# Patient Record
Sex: MALE | Race: WHITE | HISPANIC OR LATINO | URBAN - METROPOLITAN AREA
[De-identification: names, ages, dates, MRNs, and addresses within clinical notes are randomized per-mention and may not be internally consistent; named-entity substitution may affect disease eponyms.]

---

## 2017-03-28 ENCOUNTER — OUTPATIENT (OUTPATIENT)
Dept: OUTPATIENT SERVICES | Facility: HOSPITAL | Age: 67
LOS: 1 days | End: 2017-03-28
Payer: COMMERCIAL

## 2017-03-28 ENCOUNTER — APPOINTMENT (OUTPATIENT)
Dept: CV DIAGNOSITCS | Facility: HOSPITAL | Age: 67
End: 2017-03-28

## 2017-03-28 DIAGNOSIS — R07.89 OTHER CHEST PAIN: ICD-10-CM

## 2017-03-28 DIAGNOSIS — I48.91 UNSPECIFIED ATRIAL FIBRILLATION: ICD-10-CM

## 2017-03-28 PROCEDURE — 93227 XTRNL ECG REC<48 HR R&I: CPT

## 2017-03-28 PROCEDURE — 93306 TTE W/DOPPLER COMPLETE: CPT | Mod: 26

## 2017-08-05 ENCOUNTER — INPATIENT (INPATIENT)
Facility: HOSPITAL | Age: 67
LOS: 2 days | Discharge: HOME | End: 2017-08-08
Attending: INTERNAL MEDICINE | Admitting: INTERNAL MEDICINE
Payer: COMMERCIAL

## 2017-08-05 VITALS
DIASTOLIC BLOOD PRESSURE: 92 MMHG | RESPIRATION RATE: 16 BRPM | TEMPERATURE: 98 F | HEART RATE: 90 BPM | SYSTOLIC BLOOD PRESSURE: 122 MMHG | OXYGEN SATURATION: 96 %

## 2017-08-05 DIAGNOSIS — L03.90 CELLULITIS, UNSPECIFIED: ICD-10-CM

## 2017-08-05 LAB
ALBUMIN SERPL ELPH-MCNC: 3.9 G/DL — SIGNIFICANT CHANGE UP (ref 3.3–5)
ALP SERPL-CCNC: 137 U/L — HIGH (ref 40–120)
ALT FLD-CCNC: 31 U/L — SIGNIFICANT CHANGE UP (ref 4–41)
APTT BLD: 69.6 SEC — HIGH (ref 27.5–37.4)
AST SERPL-CCNC: 65 U/L — HIGH (ref 4–40)
BASOPHILS # BLD AUTO: 0.05 K/UL — SIGNIFICANT CHANGE UP (ref 0–0.2)
BASOPHILS NFR BLD AUTO: 0.6 % — SIGNIFICANT CHANGE UP (ref 0–2)
BILIRUB SERPL-MCNC: 1.2 MG/DL — SIGNIFICANT CHANGE UP (ref 0.2–1.2)
BUN SERPL-MCNC: 13 MG/DL — SIGNIFICANT CHANGE UP (ref 7–23)
CALCIUM SERPL-MCNC: 8.5 MG/DL — SIGNIFICANT CHANGE UP (ref 8.4–10.5)
CHLORIDE SERPL-SCNC: 95 MMOL/L — LOW (ref 98–107)
CO2 SERPL-SCNC: 28 MMOL/L — SIGNIFICANT CHANGE UP (ref 22–31)
CREAT SERPL-MCNC: 1.02 MG/DL — SIGNIFICANT CHANGE UP (ref 0.5–1.3)
EOSINOPHIL # BLD AUTO: 0.39 K/UL — SIGNIFICANT CHANGE UP (ref 0–0.5)
EOSINOPHIL NFR BLD AUTO: 4.6 % — SIGNIFICANT CHANGE UP (ref 0–6)
GLUCOSE SERPL-MCNC: 129 MG/DL — HIGH (ref 70–99)
HCT VFR BLD CALC: 33.6 % — LOW (ref 39–50)
HGB BLD-MCNC: 10.6 G/DL — LOW (ref 13–17)
IMM GRANULOCYTES # BLD AUTO: 0.05 # — SIGNIFICANT CHANGE UP
IMM GRANULOCYTES NFR BLD AUTO: 0.6 % — SIGNIFICANT CHANGE UP (ref 0–1.5)
INR BLD: 2.98 — HIGH (ref 0.88–1.17)
LYMPHOCYTES # BLD AUTO: 0.7 K/UL — LOW (ref 1–3.3)
LYMPHOCYTES # BLD AUTO: 8.3 % — LOW (ref 13–44)
MCHC RBC-ENTMCNC: 30.2 PG — SIGNIFICANT CHANGE UP (ref 27–34)
MCHC RBC-ENTMCNC: 31.5 % — LOW (ref 32–36)
MCV RBC AUTO: 95.7 FL — SIGNIFICANT CHANGE UP (ref 80–100)
MONOCYTES # BLD AUTO: 0.76 K/UL — SIGNIFICANT CHANGE UP (ref 0–0.9)
MONOCYTES NFR BLD AUTO: 9 % — SIGNIFICANT CHANGE UP (ref 2–14)
NEUTROPHILS # BLD AUTO: 6.51 K/UL — SIGNIFICANT CHANGE UP (ref 1.8–7.4)
NEUTROPHILS NFR BLD AUTO: 76.9 % — SIGNIFICANT CHANGE UP (ref 43–77)
NRBC # FLD: 0 — SIGNIFICANT CHANGE UP
PLATELET # BLD AUTO: 373 K/UL — SIGNIFICANT CHANGE UP (ref 150–400)
PMV BLD: 8.8 FL — SIGNIFICANT CHANGE UP (ref 7–13)
POTASSIUM SERPL-MCNC: 5 MMOL/L — SIGNIFICANT CHANGE UP (ref 3.5–5.3)
POTASSIUM SERPL-SCNC: 5 MMOL/L — SIGNIFICANT CHANGE UP (ref 3.5–5.3)
PROT SERPL-MCNC: 8.4 G/DL — HIGH (ref 6–8.3)
PROTHROM AB SERPL-ACNC: 34.1 SEC — HIGH (ref 9.8–13.1)
RBC # BLD: 3.51 M/UL — LOW (ref 4.2–5.8)
RBC # FLD: 14.9 % — HIGH (ref 10.3–14.5)
SODIUM SERPL-SCNC: 136 MMOL/L — SIGNIFICANT CHANGE UP (ref 135–145)
WBC # BLD: 8.46 K/UL — SIGNIFICANT CHANGE UP (ref 3.8–10.5)
WBC # FLD AUTO: 8.46 K/UL — SIGNIFICANT CHANGE UP (ref 3.8–10.5)

## 2017-08-05 PROCEDURE — 99223 1ST HOSP IP/OBS HIGH 75: CPT

## 2017-08-05 RX ORDER — WARFARIN SODIUM 2.5 MG/1
2 TABLET ORAL ONCE
Qty: 0 | Refills: 0 | Status: COMPLETED | OUTPATIENT
Start: 2017-08-05 | End: 2017-08-05

## 2017-08-05 RX ADMIN — WARFARIN SODIUM 2 MILLIGRAM(S): 2.5 TABLET ORAL at 22:51

## 2017-08-05 RX ADMIN — Medication 100 MILLIGRAM(S): at 23:20

## 2017-08-05 NOTE — H&P ADULT - NSHPPHYSICALEXAM_GEN_ALL_CORE
PHYSICAL EXAM:      Constitutional: NAD, well-groomed, well-developed  HEENT: PERRLA, EOMI, Normal Hearing  Back: Normal spine flexure, No CVA tenderness  Respiratory: CTAB  Cardiovascular: S1 and S2, Premature contractions noted, metallic click audible   Gastrointestinal: BS+, soft, NT/ND  Extremities:1+ pitting edema BL LE  Vascular: 2+ peripheral pulses  Neurological: A/O x 3, no focal deficits  Psychiatric: Normal mood, normal affect  Musculoskeletal: 5/5 strength b/l upper and lower extremities  Skin: confluent erythema below right knee extending distally near foot

## 2017-08-05 NOTE — H&P ADULT - HISTORY OF PRESENT ILLNESS
68 yo m h/o rheumatic heart disease, s/p metallic mitral valve placement 2000. 2 weeks prior july 19th 2017, underwent CABG as well as metallic aortic valve repair at [facility]. Seen by Dr Lc greenwood PTA who noted redness/warmth at distal  RLE, and sent pt to ed for evaluation. Pt denies cp, sob, palpitations, h/o malignancy, or blood clot. Denies hemoptysis. INR currently 2.98. Pt denies fevers, chills, nausea, vomiting, diarrhea, trauma, insect bite 66 yo m h/o rheumatic heart disease, afib,  s/p metallic mitral valve placement 2000. 2 weeks ago, july 19th 2017, underwent CABG as well as metallic aortic valve repair at [facility]. Seen by Dr Lc greenwood PTA who noted redness/warmth at distal  RLE, and sent pt to ed for evaluation. Pt denies cp, sob, palpitations, h/o malignancy, or blood clot. Denies hemoptysis. INR currently 2.98. Pt denies fevers, chills, nausea, vomiting, diarrhea, trauma, insect bite

## 2017-08-05 NOTE — ED PROVIDER NOTE - ATTENDING CONTRIBUTION TO CARE
Attending note:   After face to face evaluation of this patient, I concur with above noted hx, pe, and care plan for this patient. 68 y/o M s/p mechanical valves x 2 and cabg in [place] with patient arriving in [place] by car this past week with new redness in r lower extremity and leg swelling (s/p vein-stripping).   Evaluation in progress

## 2017-08-05 NOTE — ED PROVIDER NOTE - MEDICAL DECISION MAKING DETAILS
rle swelling and erythema, recent surgery and recent long drive, will r/o dvt, give abx, and likely admit

## 2017-08-05 NOTE — H&P ADULT - NSHPREVIEWOFSYSTEMS_GEN_ALL_CORE
Review of Systems:   CONSTITUTIONAL: No fever, weight loss, or fatigue  EYES: No eye pain, visual disturbances, or discharge  ENMT:  No difficulty hearing, tinnitus, vertigo; No sinus or throat pain  NECK: No pain or stiffness  RESPIRATORY: No cough, wheezing, chills or hemoptysis; No shortness of breath. stable dyspnea with modearte exertion  CARDIOVASCULAR: No chest pain, palpitations, dizziness  GASTROINTESTINAL: No abdominal or epigastric pain. No nausea, vomiting, or hematemesis; No diarrhea or constipation. No melena or hematochezia.  GENITOURINARY: No dysuria, frequency, hematuria, or incontinence  NEUROLOGICAL: No headaches, memory loss, loss of strength, numbness, or tremors  SKIN: leg redness  LYMPH NODES: No enlarged glands  ENDOCRINE: No heat or cold intolerance; No hair loss  MUSCULOSKELETAL: No joint pain or swelling; No muscle, back, or extremity pain  HEME/LYMPH: No easy bruising, or bleeding gums  ALLERY AND IMMUNOLOGIC: No hives or eczema

## 2017-08-05 NOTE — H&P ADULT - PROBLEM SELECTOR PLAN 2
-INR >4 on friday; coumadin held on Friday, restated Saturday at 2mg instead of 5mg  -plan for additional 2mg sunday night pending repeat INR levels  -would contact cardiologist listed above to notify pt here, general reccs including appropriate cardiac rehab while pt in-house

## 2017-08-05 NOTE — ED ADULT NURSE NOTE - OBJECTIVE STATEMENT
pt received a&ox3, sent in by pmd s/p cabg at [facility] this week, states they drove back with stops along the way, md sent in for suspected cellulitis to right leg, swelling and redness noted, slightly warm to touch, stiches noted to leg and chest, no signs of infection noted to chest, pt states he always has cp, denies sob/nv, pt appears to be comfortable and in NAD, denies any complaints other than appearance of leg, vs as reported, md at bedside, pt placed on monitor, will continue to monitor.

## 2017-08-05 NOTE — ED ADULT TRIAGE NOTE - CHIEF COMPLAINT QUOTE
sent by PMD for IV antibiotics for right lower leg cellulitis.  (+) redness and swelling noted.  No sono performed to extremity post CABG 07/19/17.  Denies any fever.  PMH: HTN, fluid overload, afib, MVR, AVR,

## 2017-08-05 NOTE — H&P ADULT - ASSESSMENT
68 yo m with suspected right leg cellulitis s/p recent cabg/ aortic valve repair on coumadin. H/o metallic mitral valve

## 2017-08-05 NOTE — ED PROVIDER NOTE - OBJECTIVE STATEMENT
67M with pmh of recent b/l mechanical value placement (aortic, and mitral), and tricuspid valve repair, on warfarin p/w r le extremity cellulitis x 2 days and recent driving from [facility] to here; no cp/sob/n/v/d/dysuria/c/cough/cold;

## 2017-08-06 DIAGNOSIS — I05.9 RHEUMATIC MITRAL VALVE DISEASE, UNSPECIFIED: ICD-10-CM

## 2017-08-06 DIAGNOSIS — R60.0 LOCALIZED EDEMA: ICD-10-CM

## 2017-08-06 DIAGNOSIS — L03.115 CELLULITIS OF RIGHT LOWER LIMB: ICD-10-CM

## 2017-08-06 DIAGNOSIS — I48.91 UNSPECIFIED ATRIAL FIBRILLATION: ICD-10-CM

## 2017-08-06 LAB
APTT BLD: 68.2 SEC — HIGH (ref 27.5–37.4)
BUN SERPL-MCNC: 14 MG/DL — SIGNIFICANT CHANGE UP (ref 7–23)
CALCIUM SERPL-MCNC: 8.5 MG/DL — SIGNIFICANT CHANGE UP (ref 8.4–10.5)
CHLORIDE SERPL-SCNC: 100 MMOL/L — SIGNIFICANT CHANGE UP (ref 98–107)
CO2 SERPL-SCNC: 27 MMOL/L — SIGNIFICANT CHANGE UP (ref 22–31)
CREAT SERPL-MCNC: 0.84 MG/DL — SIGNIFICANT CHANGE UP (ref 0.5–1.3)
GLUCOSE SERPL-MCNC: 96 MG/DL — SIGNIFICANT CHANGE UP (ref 70–99)
HCT VFR BLD CALC: 29.3 % — LOW (ref 39–50)
HGB BLD-MCNC: 9.3 G/DL — LOW (ref 13–17)
INR BLD: 2.9 — HIGH (ref 0.88–1.17)
MCHC RBC-ENTMCNC: 30.3 PG — SIGNIFICANT CHANGE UP (ref 27–34)
MCHC RBC-ENTMCNC: 31.7 % — LOW (ref 32–36)
MCV RBC AUTO: 95.4 FL — SIGNIFICANT CHANGE UP (ref 80–100)
NRBC # FLD: 0 — SIGNIFICANT CHANGE UP
PLATELET # BLD AUTO: 317 K/UL — SIGNIFICANT CHANGE UP (ref 150–400)
PMV BLD: 8.6 FL — SIGNIFICANT CHANGE UP (ref 7–13)
POTASSIUM SERPL-MCNC: 4.1 MMOL/L — SIGNIFICANT CHANGE UP (ref 3.5–5.3)
POTASSIUM SERPL-SCNC: 4.1 MMOL/L — SIGNIFICANT CHANGE UP (ref 3.5–5.3)
PROTHROM AB SERPL-ACNC: 33.2 SEC — HIGH (ref 9.8–13.1)
RBC # BLD: 3.07 M/UL — LOW (ref 4.2–5.8)
RBC # FLD: 14.9 % — HIGH (ref 10.3–14.5)
SODIUM SERPL-SCNC: 138 MMOL/L — SIGNIFICANT CHANGE UP (ref 135–145)
WBC # BLD: 6.99 K/UL — SIGNIFICANT CHANGE UP (ref 3.8–10.5)
WBC # FLD AUTO: 6.99 K/UL — SIGNIFICANT CHANGE UP (ref 3.8–10.5)

## 2017-08-06 PROCEDURE — 93971 EXTREMITY STUDY: CPT | Mod: 26,LT

## 2017-08-06 RX ORDER — WARFARIN SODIUM 2.5 MG/1
2 TABLET ORAL ONCE
Qty: 0 | Refills: 0 | Status: COMPLETED | OUTPATIENT
Start: 2017-08-06 | End: 2017-08-06

## 2017-08-06 RX ORDER — ASPIRIN/CALCIUM CARB/MAGNESIUM 324 MG
1 TABLET ORAL
Qty: 0 | Refills: 0 | COMMUNITY

## 2017-08-06 RX ORDER — LOSARTAN POTASSIUM 100 MG/1
50 TABLET, FILM COATED ORAL DAILY
Qty: 0 | Refills: 0 | Status: DISCONTINUED | OUTPATIENT
Start: 2017-08-06 | End: 2017-08-08

## 2017-08-06 RX ORDER — LOSARTAN POTASSIUM 100 MG/1
1 TABLET, FILM COATED ORAL
Qty: 0 | Refills: 0 | COMMUNITY

## 2017-08-06 RX ORDER — ATORVASTATIN CALCIUM 80 MG/1
20 TABLET, FILM COATED ORAL AT BEDTIME
Qty: 0 | Refills: 0 | Status: DISCONTINUED | OUTPATIENT
Start: 2017-08-06 | End: 2017-08-08

## 2017-08-06 RX ORDER — FUROSEMIDE 40 MG
1 TABLET ORAL
Qty: 0 | Refills: 0 | COMMUNITY

## 2017-08-06 RX ORDER — OXYCODONE HYDROCHLORIDE 5 MG/1
1 TABLET ORAL
Qty: 0 | Refills: 0 | COMMUNITY

## 2017-08-06 RX ORDER — DILTIAZEM HCL 120 MG
300 CAPSULE, EXT RELEASE 24 HR ORAL DAILY
Qty: 0 | Refills: 0 | Status: DISCONTINUED | OUTPATIENT
Start: 2017-08-06 | End: 2017-08-08

## 2017-08-06 RX ORDER — ASPIRIN/CALCIUM CARB/MAGNESIUM 324 MG
81 TABLET ORAL EVERY 12 HOURS
Qty: 0 | Refills: 0 | Status: DISCONTINUED | OUTPATIENT
Start: 2017-08-06 | End: 2017-08-08

## 2017-08-06 RX ORDER — FUROSEMIDE 40 MG
40 TABLET ORAL DAILY
Qty: 0 | Refills: 0 | Status: DISCONTINUED | OUTPATIENT
Start: 2017-08-06 | End: 2017-08-08

## 2017-08-06 RX ORDER — ATORVASTATIN CALCIUM 80 MG/1
20 TABLET, FILM COATED ORAL ONCE
Qty: 0 | Refills: 0 | Status: COMPLETED | OUTPATIENT
Start: 2017-08-06 | End: 2017-08-06

## 2017-08-06 RX ORDER — POTASSIUM CHLORIDE 20 MEQ
1 PACKET (EA) ORAL
Qty: 0 | Refills: 0 | COMMUNITY

## 2017-08-06 RX ORDER — DILTIAZEM HCL 120 MG
1 CAPSULE, EXT RELEASE 24 HR ORAL
Qty: 0 | Refills: 0 | COMMUNITY

## 2017-08-06 RX ORDER — ASPIRIN/CALCIUM CARB/MAGNESIUM 324 MG
81 TABLET ORAL DAILY
Qty: 0 | Refills: 0 | Status: DISCONTINUED | OUTPATIENT
Start: 2017-08-06 | End: 2017-08-06

## 2017-08-06 RX ORDER — PANTOPRAZOLE SODIUM 20 MG/1
40 TABLET, DELAYED RELEASE ORAL
Qty: 0 | Refills: 0 | Status: DISCONTINUED | OUTPATIENT
Start: 2017-08-06 | End: 2017-08-08

## 2017-08-06 RX ORDER — PANTOPRAZOLE SODIUM 20 MG/1
1 TABLET, DELAYED RELEASE ORAL
Qty: 0 | Refills: 0 | COMMUNITY

## 2017-08-06 RX ORDER — ATORVASTATIN CALCIUM 80 MG/1
1 TABLET, FILM COATED ORAL
Qty: 0 | Refills: 0 | COMMUNITY

## 2017-08-06 RX ORDER — POTASSIUM CHLORIDE 20 MEQ
20 PACKET (EA) ORAL DAILY
Qty: 0 | Refills: 0 | Status: DISCONTINUED | OUTPATIENT
Start: 2017-08-06 | End: 2017-08-08

## 2017-08-06 RX ADMIN — WARFARIN SODIUM 2 MILLIGRAM(S): 2.5 TABLET ORAL at 17:57

## 2017-08-06 RX ADMIN — Medication 100 MILLIGRAM(S): at 14:26

## 2017-08-06 RX ADMIN — LOSARTAN POTASSIUM 50 MILLIGRAM(S): 100 TABLET, FILM COATED ORAL at 06:31

## 2017-08-06 RX ADMIN — Medication 100 MILLIGRAM(S): at 21:48

## 2017-08-06 RX ADMIN — Medication 81 MILLIGRAM(S): at 14:26

## 2017-08-06 RX ADMIN — Medication 300 MILLIGRAM(S): at 06:31

## 2017-08-06 RX ADMIN — PANTOPRAZOLE SODIUM 40 MILLIGRAM(S): 20 TABLET, DELAYED RELEASE ORAL at 06:31

## 2017-08-06 RX ADMIN — Medication 40 MILLIGRAM(S): at 06:31

## 2017-08-06 RX ADMIN — Medication 100 MILLIGRAM(S): at 06:47

## 2017-08-06 RX ADMIN — ATORVASTATIN CALCIUM 20 MILLIGRAM(S): 80 TABLET, FILM COATED ORAL at 21:48

## 2017-08-06 RX ADMIN — Medication 1 TABLET(S): at 14:26

## 2017-08-06 RX ADMIN — Medication 81 MILLIGRAM(S): at 17:56

## 2017-08-06 RX ADMIN — ATORVASTATIN CALCIUM 20 MILLIGRAM(S): 80 TABLET, FILM COATED ORAL at 01:29

## 2017-08-06 NOTE — PROGRESS NOTE ADULT - SUBJECTIVE AND OBJECTIVE BOX
complaints-  patient seen in my office yesterday  swelling of thelegs  denies chest pain or  sob or dizziness or palpitations.   denies fever or chills    PMH- cad/mvr/avr/HTN/atrial fibrillation    PSH- cabg/mvr/avr/cholecystectomy    ALL- nkda    meds- as per chart    smoking- negative    Vital Signs Last 24 Hrs  T(C): 36.8 (06 Aug 2017 14:56), Max: 37.1 (06 Aug 2017 05:52)  T(F): 98.3 (06 Aug 2017 14:56), Max: 98.7 (06 Aug 2017 05:52)  HR: 86 (06 Aug 2017 14:56) (83 - 99)  BP: 122/70 (06 Aug 2017 14:56) (122/70 - 159/55)  BP(mean): --  RR: 18 (06 Aug 2017 14:56) (16 - 25)  SpO2: 99% (06 Aug 2017 14:56) (95% - 99%)      CAPILLARY BLOOD GLUCOSE    Physical exam  awake and alert  neck - no0 JVD  cvs- s1s2 present. no s3s4  ra- bilateral air entry present. no creps  pa- no g/r/d/t. bs present   ext- 1+edema bilaterally  erythema with warmth in the right lower leg below the knee to above the ankle in the cherrie medial aspect      Labs                        9.3    6.99  )-----------( 317      ( 06 Aug 2017 06:30 )             29.3     08-06    138  |  100  |  14  ----------------------------<  96  4.1   |  27  |  0.84    Ca    8.5      06 Aug 2017 06:30    TPro  8.4<H>  /  Alb  3.9  /  TBili  1.2  /  DBili  x   /  AST  65<H>  /  ALT  31  /  AlkPhos  137<H>  08-05        PT/INR - ( 06 Aug 2017 06:30 )   PT: 33.2 SEC;   INR: 2.90          PTT - ( 06 Aug 2017 06:30 )  PTT:68.2 SEC      Assessment and plan  patient mvr s/p recent avr with cad s/p cabg with swelling and redness in the RLE- ruled out for DVT. possible cellulitis RLE- on iv clindamycin    obtain blood cultures    HTN/afib- continue cardizem 300 mg/lasix 40 mg po qd/losartan 50 mg po qd    afib/mvr/avr- on coumadin based on INR    continue current care.  discussed with patient and wife at bedside

## 2017-08-06 NOTE — PATIENT PROFILE ADULT. - LEARNING ASSESSMENT (PATIENT) ADDITIONAL COMMENTS
Patient anxious to be hospitalized.  Wife accompanied him to hospital.  She is an RN and Patient seems dependent on her caring for him.

## 2017-08-07 LAB
BUN SERPL-MCNC: 11 MG/DL — SIGNIFICANT CHANGE UP (ref 7–23)
CALCIUM SERPL-MCNC: 8.7 MG/DL — SIGNIFICANT CHANGE UP (ref 8.4–10.5)
CHLORIDE SERPL-SCNC: 97 MMOL/L — LOW (ref 98–107)
CO2 SERPL-SCNC: 29 MMOL/L — SIGNIFICANT CHANGE UP (ref 22–31)
CREAT SERPL-MCNC: 0.91 MG/DL — SIGNIFICANT CHANGE UP (ref 0.5–1.3)
GLUCOSE SERPL-MCNC: 97 MG/DL — SIGNIFICANT CHANGE UP (ref 70–99)
HCT VFR BLD CALC: 31.5 % — LOW (ref 39–50)
HGB BLD-MCNC: 10 G/DL — LOW (ref 13–17)
INR BLD: 3.44 — HIGH (ref 0.88–1.17)
MCHC RBC-ENTMCNC: 30.3 PG — SIGNIFICANT CHANGE UP (ref 27–34)
MCHC RBC-ENTMCNC: 31.7 % — LOW (ref 32–36)
MCV RBC AUTO: 95.5 FL — SIGNIFICANT CHANGE UP (ref 80–100)
NRBC # FLD: 0 — SIGNIFICANT CHANGE UP
PLATELET # BLD AUTO: 315 K/UL — SIGNIFICANT CHANGE UP (ref 150–400)
PMV BLD: 9 FL — SIGNIFICANT CHANGE UP (ref 7–13)
POTASSIUM SERPL-MCNC: 4 MMOL/L — SIGNIFICANT CHANGE UP (ref 3.5–5.3)
POTASSIUM SERPL-SCNC: 4 MMOL/L — SIGNIFICANT CHANGE UP (ref 3.5–5.3)
PROTHROM AB SERPL-ACNC: 39.5 SEC — HIGH (ref 9.8–13.1)
RBC # BLD: 3.3 M/UL — LOW (ref 4.2–5.8)
RBC # FLD: 14.6 % — HIGH (ref 10.3–14.5)
SODIUM SERPL-SCNC: 137 MMOL/L — SIGNIFICANT CHANGE UP (ref 135–145)
SPECIMEN SOURCE: SIGNIFICANT CHANGE UP
SPECIMEN SOURCE: SIGNIFICANT CHANGE UP
WBC # BLD: 5.81 K/UL — SIGNIFICANT CHANGE UP (ref 3.8–10.5)
WBC # FLD AUTO: 5.81 K/UL — SIGNIFICANT CHANGE UP (ref 3.8–10.5)

## 2017-08-07 RX ORDER — SIMETHICONE 80 MG/1
80 TABLET, CHEWABLE ORAL ONCE
Qty: 0 | Refills: 0 | Status: DISCONTINUED | OUTPATIENT
Start: 2017-08-07 | End: 2017-08-07

## 2017-08-07 RX ADMIN — Medication 1 TABLET(S): at 13:18

## 2017-08-07 RX ADMIN — Medication 81 MILLIGRAM(S): at 17:34

## 2017-08-07 RX ADMIN — Medication 300 MILLIGRAM(S): at 05:55

## 2017-08-07 RX ADMIN — Medication 100 MILLIGRAM(S): at 13:18

## 2017-08-07 RX ADMIN — ATORVASTATIN CALCIUM 20 MILLIGRAM(S): 80 TABLET, FILM COATED ORAL at 21:19

## 2017-08-07 RX ADMIN — LOSARTAN POTASSIUM 50 MILLIGRAM(S): 100 TABLET, FILM COATED ORAL at 05:55

## 2017-08-07 RX ADMIN — Medication 81 MILLIGRAM(S): at 05:55

## 2017-08-07 RX ADMIN — Medication 100 MILLIGRAM(S): at 05:55

## 2017-08-07 RX ADMIN — Medication 40 MILLIGRAM(S): at 05:55

## 2017-08-07 RX ADMIN — PANTOPRAZOLE SODIUM 40 MILLIGRAM(S): 20 TABLET, DELAYED RELEASE ORAL at 05:55

## 2017-08-07 NOTE — CONSULT NOTE ADULT - ASSESSMENT
Valvular and ischemic cardiomyopathy, s/p redo OHS with AVR/MVR/CABG/TV repair 2 weeks ago at the [facility].  I tend to doubt cellulitis with patient afebrile, normal wbc, and lack of pain.  I suspect we are dealing with post op venous stasis and soft tissue hematoma.  I would like to limit antibiotics which are unlikely to be of benefit.  Suggest:   Favor d/c of antibiotics   Leg elevation   Diuresis   He will likely need compressive stockings.Untill edema is improved I would suggest ACE wraps with gradual increase in compression   Above reviewed with the patient.

## 2017-08-07 NOTE — PROGRESS NOTE ADULT - SUBJECTIVE AND OBJECTIVE BOX
complaints-  patient is comfortable. denies chest pain or sob  denies fever or chills   wife at bedside        Vital Signs Last 24 Hrs  T(C): 36.6 (07 Aug 2017 15:38), Max: 37.1 (06 Aug 2017 21:51)  T(F): 97.8 (07 Aug 2017 15:38), Max: 98.8 (06 Aug 2017 21:51)  HR: 100 (07 Aug 2017 15:38) (93 - 100)  BP: 133/69 (07 Aug 2017 15:38) (133/69 - 155/80)  BP(mean): --  RR: 19 (07 Aug 2017 15:38) (18 - 19)  SpO2: 97% (07 Aug 2017 15:38) (96% - 97%)      CAPILLARY BLOOD GLUCOSE  Physical exam  awake and alert   neck - no jvd   cvs- s1s2 present. no s3s4  rs- bilateral air entry present.mildly decreased air entry in the posterior bases  pa- no g/r/d/t. bs present  ext- 1+edema in the LLE is reduced compared to the RLE  RLE- diffuse erythema with some warmth. no tenderness      Labs                        10.0   5.81  )-----------( 315      ( 07 Aug 2017 05:45 )             31.5     08-07    137  |  97<L>  |  11  ----------------------------<  97  4.0   |  29  |  0.91    Ca    8.7      07 Aug 2017 05:45    TPro  8.4<H>  /  Alb  3.9  /  TBili  1.2  /  DBili  x   /  AST  65<H>  /  ALT  31  /  AlkPhos  137<H>  08-05        PT/INR - ( 07 Aug 2017 05:45 )   PT: 39.5 SEC;   INR: 3.44          PTT - ( 06 Aug 2017 06:30 )  PTT:68.2 SEC    blood cultures- negative so far      Assessment and plan  patient s/p avr  s/p recent cabg with swelling and redness of the RLE- afebrile with normal wbc count. agree with Id about venous statis with some hematoma- will dc clindamycin and persue diuresis  continue lasix 40 mg po qd.venous stockings.  will also obtain chest xray to R/O Pleural effusion.  warfarin based on INR   continue current meds.  discussed with patient and his wife

## 2017-08-07 NOTE — CONSULT NOTE ADULT - SUBJECTIVE AND OBJECTIVE BOX
HPI:   Patient is a 67y male with a history of valvular cardiomyopathy felt secondary to rheumatic fever who is s/p redo AVR,MVR and TV repair/CABG at the [facility] on 7/19.He had a relatively uneventful post op course.He had developed swelling of Rt leg, was taking anticoagulation, and was admitted to [facility] for evaluation.He has had negative dopplers for DVT, was placed on clinda for possible infection.No fever or chills.No significant pain.He has been ambulatory and is receiving diuretics.    REVIEW OF SYSTEMS:  All other review of systems negative (Comprehensive ROS)    PAST MEDICAL & SURGICAL HISTORY:  HTN (Hypertension)  Afib  S/P MVR (Mitral Valve Replacement)  S/P AVR (Aortic Valve Replacement)      Allergies    No Known Allergies    Intolerances        Antimicrobials Day # 3  clindamycin IVPB 600 milliGRAM(s) IV Intermittent every 8 hours    Other Medications:  diltiazem    milliGRAM(s) Oral daily  potassium chloride    Tablet ER 20 milliEquivalent(s) Oral daily PRN  pantoprazole    Tablet 40 milliGRAM(s) Oral before breakfast  multivitamin 1 Tablet(s) Oral daily  atorvastatin 20 milliGRAM(s) Oral at bedtime  furosemide    Tablet 40 milliGRAM(s) Oral daily  bisacodyl 5 milliGRAM(s) Oral every 12 hours PRN  losartan 50 milliGRAM(s) Oral daily  aspirin enteric coated 81 milliGRAM(s) Oral every 12 hours      FAMILY HISTORY:      SOCIAL HISTORY:  Smoking: remote    ETOH:  no    Drug Use: no        T(F): 97.8 (08-07-17 @ 15:38), Max: 98.8 (08-06-17 @ 21:51)  HR: 100 (08-07-17 @ 15:38)  BP: 133/69 (08-07-17 @ 15:38)  RR: 19 (08-07-17 @ 15:38)  SpO2: 97% (08-07-17 @ 15:38)  Wt(kg): --    PHYSICAL EXAM:  General: alert, no acute distress  Eyes:  anicteric, no conjunctival injection, no discharge  Oropharynx: no lesions or injection 	  Neck: supple, without adenopathy  Lungs: diminished at bases.chest incision C/D/I  Heart: irregular rate and rhythm; no murmur, rubs or gallops  Abdomen: soft, nondistended, nontender, without mass or organomegaly  Skin: no lesions  Extremities: 2-3 T edema of right leg.1+ edema of left leg with venous stasis changes.Diffuse ecchymosis and warmth of right calf, dried scabs at exit sites of vein harvest site.No tenderness to plapation  Neurologic: alert, oriented, moves all extremities    LAB RESULTS:                        10.0   5.81  )-----------( 315      ( 07 Aug 2017 05:45 )             31.5     08-07    137  |  97<L>  |  11  ----------------------------<  97  4.0   |  29  |  0.91    Ca    8.7      07 Aug 2017 05:45    TPro  8.4<H>  /  Alb  3.9  /  TBili  1.2  /  DBili  x   /  AST  65<H>  /  ALT  31  /  AlkPhos  137<H>  08-05    LIVER FUNCTIONS - ( 05 Aug 2017 20:02 )  Alb: 3.9 g/dL / Pro: 8.4 g/dL / ALK PHOS: 137 u/L / ALT: 31 u/L / AST: 65 u/L / GGT: x               MICROBIOLOGY:  RECENT CULTURES:  HPI:   Patient is a 67y male with    REVIEW OF SYSTEMS:  All other review of systems negative (Comprehensive ROS)    PAST MEDICAL & SURGICAL HISTORY:  HTN (Hypertension)  Afib  S/P MVR (Mitral Valve Replacement)  S/P AVR (Aortic Valve Replacement)      Allergies    No Known Allergies    Intolerances        Antimicrobials Day #  :  clindamycin IVPB 600 milliGRAM(s) IV Intermittent every 8 hours    Other Medications:  diltiazem    milliGRAM(s) Oral daily  potassium chloride    Tablet ER 20 milliEquivalent(s) Oral daily PRN  pantoprazole    Tablet 40 milliGRAM(s) Oral before breakfast  multivitamin 1 Tablet(s) Oral daily  atorvastatin 20 milliGRAM(s) Oral at bedtime  furosemide    Tablet 40 milliGRAM(s) Oral daily  bisacodyl 5 milliGRAM(s) Oral every 12 hours PRN  losartan 50 milliGRAM(s) Oral daily  aspirin enteric coated 81 milliGRAM(s) Oral every 12 hours      FAMILY HISTORY:      SOCIAL HISTORY:  Smoking:     ETOH:     Drug Use:     Single     T(F): 97.8 (08-07-17 @ 15:38), Max: 98.8 (08-06-17 @ 21:51)  HR: 100 (08-07-17 @ 15:38)  BP: 133/69 (08-07-17 @ 15:38)  RR: 19 (08-07-17 @ 15:38)  SpO2: 97% (08-07-17 @ 15:38)  Wt(kg): --    PHYSICAL EXAM:  General: alert, no acute distress  Eyes:  anicteric, no conjunctival injection, no discharge  Oropharynx: no lesions or injection 	  Neck: supple, without adenopathy  Lungs: clear to auscultation  Heart: regular rate and rhythm; no murmur, rubs or gallops  Abdomen: soft, nondistended, nontender, without mass or organomegaly  Skin: no lesions  Extremities: no clubbing, cyanosis, or edema  Neurologic: alert, oriented, moves all extremities    LAB RESULTS:                        10.0   5.81  )-----------( 315      ( 07 Aug 2017 05:45 )             31.5     08-07    137  |  97<L>  |  11  ----------------------------<  97  4.0   |  29  |  0.91    Ca    8.7      07 Aug 2017 05:45    TPro  8.4<H>  /  Alb  3.9  /  TBili  1.2  /  DBili  x   /  AST  65<H>  /  ALT  31  /  AlkPhos  137<H>  08-05    LIVER FUNCTIONS - ( 05 Aug 2017 20:02 )  Alb: 3.9 g/dL / Pro: 8.4 g/dL / ALK PHOS: 137 u/L / ALT: 31 u/L / AST: 65 u/L / GGT: x               MICROBIOLOGY:  RECENT CULTURES:    none sent          RADIOLOGY REVIEWED:  Leg dopplers are negative for DVT

## 2017-08-08 ENCOUNTER — TRANSCRIPTION ENCOUNTER (OUTPATIENT)
Age: 67
End: 2017-08-08

## 2017-08-08 VITALS
DIASTOLIC BLOOD PRESSURE: 77 MMHG | HEART RATE: 69 BPM | SYSTOLIC BLOOD PRESSURE: 132 MMHG | TEMPERATURE: 99 F | OXYGEN SATURATION: 100 % | RESPIRATION RATE: 18 BRPM

## 2017-08-08 LAB
INR BLD: 2.86 — HIGH (ref 0.88–1.17)
PROTHROM AB SERPL-ACNC: 32.7 SEC — HIGH (ref 9.8–13.1)

## 2017-08-08 PROCEDURE — 71020: CPT | Mod: 26

## 2017-08-08 RX ORDER — WARFARIN SODIUM 2.5 MG/1
2 TABLET ORAL ONCE
Qty: 0 | Refills: 0 | Status: DISCONTINUED | OUTPATIENT
Start: 2017-08-08 | End: 2017-08-08

## 2017-08-08 RX ORDER — WARFARIN SODIUM 2.5 MG/1
1 TABLET ORAL
Qty: 0 | Refills: 0 | COMMUNITY

## 2017-08-08 RX ORDER — WARFARIN SODIUM 2.5 MG/1
5 TABLET ORAL ONCE
Qty: 0 | Refills: 0 | Status: DISCONTINUED | OUTPATIENT
Start: 2017-08-08 | End: 2017-08-08

## 2017-08-08 RX ORDER — WARFARIN SODIUM 2.5 MG/1
1 TABLET ORAL
Qty: 2 | Refills: 0 | OUTPATIENT
Start: 2017-08-08 | End: 2017-08-10

## 2017-08-08 RX ORDER — WARFARIN SODIUM 2.5 MG/1
1 TABLET ORAL
Qty: 1 | Refills: 0 | OUTPATIENT
Start: 2017-08-08 | End: 2017-08-09

## 2017-08-08 RX ORDER — WARFARIN SODIUM 2.5 MG/1
3 TABLET ORAL ONCE
Qty: 0 | Refills: 0 | Status: DISCONTINUED | OUTPATIENT
Start: 2017-08-08 | End: 2017-08-08

## 2017-08-08 RX ADMIN — Medication 300 MILLIGRAM(S): at 06:43

## 2017-08-08 RX ADMIN — Medication 1 TABLET(S): at 12:46

## 2017-08-08 RX ADMIN — PANTOPRAZOLE SODIUM 40 MILLIGRAM(S): 20 TABLET, DELAYED RELEASE ORAL at 06:43

## 2017-08-08 RX ADMIN — Medication 40 MILLIGRAM(S): at 06:43

## 2017-08-08 RX ADMIN — LOSARTAN POTASSIUM 50 MILLIGRAM(S): 100 TABLET, FILM COATED ORAL at 06:43

## 2017-08-08 RX ADMIN — Medication 81 MILLIGRAM(S): at 06:43

## 2017-08-08 NOTE — CHART NOTE - NSCHARTNOTEFT_GEN_A_CORE
Spoke with Dr Vicente May [phone] pt's cardiology about discharge and follow up INR. Dr May requested Coumadin 5 mg tonight and will follow up with pt tomorrow. INR today 2.86. Goal 3-3.5 pt s/p MVR. Dr Platt aware as well.

## 2017-08-08 NOTE — PROGRESS NOTE ADULT - SUBJECTIVE AND OBJECTIVE BOX
complaints-  denies chest pain or sob  denies fever or chills        Vital Signs Last 24 Hrs  T(C): 37 (08 Aug 2017 05:39), Max: 37 (08 Aug 2017 05:39)  T(F): 98.6 (08 Aug 2017 05:39), Max: 98.6 (08 Aug 2017 05:39)  HR: 100 (08 Aug 2017 05:39) (100 - 103)  BP: 140/77 (08 Aug 2017 05:39) (133/69 - 150/76)  BP(mean): --  RR: 18 (08 Aug 2017 05:39) (18 - 19)  SpO2: 99% (08 Aug 2017 05:39) (97% - 99%)      CAPILLARY BLOOD GLUCOSE      Physical exam  awake and alert  neck - no jvd   cvs- s1s2 present. no s3s4  rs- bilateral air entry present  slightly decresaed air entry at the posterior bases  pa- no g/r/d/t. bs present  ext- edema is much improved  RLE- erythema in the cherrie lateral aspect present          Labs                        10.0   5.81  )-----------( 315      ( 07 Aug 2017 05:45 )             31.5     08-07    137  |  97<L>  |  11  ----------------------------<  97  4.0   |  29  |  0.91    Ca    8.7      07 Aug 2017 05:45          PT/INR - ( 08 Aug 2017 06:55 )   PT: 32.7 SEC;   INR: 2.86       blood cultures- negative    Assessment and plan  patient is s/p cabg and avr-stable  not likely cellulitis RLE-possible venous stasis and hematoma  will follow up on chest xray  continue lasix 40 mg po qd  continue rest of meds.  coumadin 2 mg tonight  discusseed with patient  dc planning based on chest xray report

## 2017-08-08 NOTE — DISCHARGE NOTE ADULT - NS AS ACTIVITY OBS
Walking-Indoors allowed/Showering allowed/Walking-Outdoors allowed as tolerated/Walking-Outdoors allowed/Showering allowed/Walking-Indoors allowed

## 2017-08-08 NOTE — DISCHARGE NOTE ADULT - CARE PROVIDER_API CALL
Joaquina Platt), Internal Medicine  [address] [place] [address]  Phone: [phone]  Fax: [phone]

## 2017-08-08 NOTE — DISCHARGE NOTE ADULT - MEDICATION SUMMARY - MEDICATIONS TO TAKE
I will START or STAY ON the medications listed below when I get home from the hospital:    aspirin 81 mg oral tablet  -- 1 tab(s) by mouth once a day  -- Indication: For Atrial fibrillation, unspecified type    oxyCODONE 5 mg oral tablet  -- 1 tab(s) by mouth every 6 hours, As Needed  -- Indication: For pain control     losartan 50 mg oral tablet  -- 1 tab(s) by mouth once a day  -- Indication: For HTN (hypertension)    DilTIAZem Hydrochloride  mg/24 hours oral capsule, extended release  -- 1 cap(s) by mouth once a day  -- Indication: For Atrial fibrillation, unspecified type    warfarin 5 mg oral tablet  -- 1 tab(s) by mouth once a day on 8/8/2017 then call your cardiologist on 8/9/2017  -- Indication: For S/P MVR (mitral valve replacement)    Lipitor 20 mg oral tablet  -- 1 tab(s) by mouth once a day  -- Indication: For HLD    Lasix 40 mg oral tablet  -- 1 tab(s) by mouth once a day  -- Indication: For Leg edema    bisacodyl 5 mg oral delayed release tablet  -- 2 tab(s) rectally once a day, As Needed  -- Indication: For Constipation     potassium chloride 20 mEq oral granule, extended release  -- 1 tab(s) by mouth once a day  -- Indication: For Hypokalemia     Protonix 20 mg oral delayed release tablet  -- 1 tab(s) by mouth once a day  -- Indication: For GERD    multivitamin  -- 1 tab(s) by mouth once a day  -- Indication: For Supplement

## 2017-08-08 NOTE — DISCHARGE NOTE ADULT - PATIENT PORTAL LINK FT
“You can access the [facility] Patient Portal, offered by [facility], by registering with the following website: [email or web address]”

## 2017-08-08 NOTE — DISCHARGE NOTE ADULT - PLAN OF CARE
resolved infection Pt initially treated with IV antibiotics, consulted by ID felt no need for antibiotics. Pt remained afebrile no leukocytosis. US showed No  evidence  of  right  lower  extremity  deep  venous  thrombosis. anticoagulation Coumadin 5 mg on 8/8/2017 call Dr Vicente May [phone] Coumadin 5 mg on 8/8/2017 call Dr Vicente May [phone] in AM to discuss Coumadin dose BP control cont Cardizem 300 mg. Lasix 40 mg po qd/losartan 50 mg po qd Pt initially treated with IV antibiotics, consulted by ID felt no need for antibiotics. Pt remained afebrile no leukocytosis. US showed No  evidence  of  right  lower  extremity  deep  venous  thrombosis.    Elevated Lower extremity, keep antiembolic stocking. Please call dr Platt's office to schedule a follow up appt in 1 week Coumadin 5 mg on 8/8/2017 call Dr Vicente May [phone] in AM to discuss Coumadin dose

## 2017-08-08 NOTE — DISCHARGE NOTE ADULT - CARE PLAN
Principal Discharge DX:	Cellulitis of right lower extremity  Goal:	resolved infection  Instructions for follow-up, activity and diet:	Pt initially treated with IV antibiotics, consulted by ID felt no need for antibiotics. Pt remained afebrile no leukocytosis. US showed No  evidence  of  right  lower  extremity  deep  venous  thrombosis.  Secondary Diagnosis:	S/P MVR (mitral valve replacement)  Goal:	anticoagulation  Instructions for follow-up, activity and diet:	Coumadin 5 mg on 8/8/2017 call Dr Vicente May [phone]  Secondary Diagnosis:	Atrial fibrillation, unspecified type  Goal:	anticoagulation  Secondary Diagnosis:	HTN (hypertension) Principal Discharge DX:	Cellulitis of right lower extremity  Goal:	resolved infection  Instructions for follow-up, activity and diet:	Pt initially treated with IV antibiotics, consulted by ID felt no need for antibiotics. Pt remained afebrile no leukocytosis. US showed No  evidence  of  right  lower  extremity  deep  venous  thrombosis.    Elevated Lower extremity, keep antiembolic stocking. Please call dr Platt's office to schedule a follow up appt in 1 week  Secondary Diagnosis:	S/P MVR (mitral valve replacement)  Goal:	anticoagulation  Instructions for follow-up, activity and diet:	Coumadin 5 mg on 8/8/2017 call Dr Vicente May [phone] in AM to discuss Coumadin dose  Secondary Diagnosis:	Atrial fibrillation, unspecified type  Goal:	anticoagulation  Instructions for follow-up, activity and diet:	Coumadin 5 mg on 8/8/2017 call Dr Vicente May [phone] in AM to discuss Coumadin dose  Secondary Diagnosis:	HTN (hypertension)  Goal:	BP control  Instructions for follow-up, activity and diet:	cont Cardizem 300 mg. Lasix 40 mg po qd/losartan 50 mg po qd Principal Discharge DX:	Cellulitis of right lower extremity  Goal:	resolved infection  Instructions for follow-up, activity and diet:	Pt initially treated with IV antibiotics, consulted by ID felt no need for antibiotics. Pt remained afebrile no leukocytosis. US showed No  evidence  of  right  lower  extremity  deep  venous  thrombosis.    Elevated Lower extremity, keep antiembolic stocking. Please call dr Platt's office to schedule a follow up appt in 1 week  Secondary Diagnosis:	S/P MVR (mitral valve replacement)  Goal:	anticoagulation  Instructions for follow-up, activity and diet:	Coumadin 5 mg on 8/8/2017 call Dr Vicente May [phone] in AM to discuss Coumadin dose  Secondary Diagnosis:	Atrial fibrillation, unspecified type  Goal:	anticoagulation  Instructions for follow-up, activity and diet:	Coumadin 5 mg on 8/8/2017 call Dr Vicente May [phone] in AM to discuss Coumadin dose  Secondary Diagnosis:	HTN (hypertension)  Goal:	BP control  Instructions for follow-up, activity and diet:	cont Cardizem 300 mg. Lasix 40 mg po qd/losartan 50 mg po qd

## 2017-08-11 LAB
BACTERIA BLD CULT: SIGNIFICANT CHANGE UP
BACTERIA BLD CULT: SIGNIFICANT CHANGE UP

## 2018-05-04 ENCOUNTER — APPOINTMENT (OUTPATIENT)
Dept: CV DIAGNOSITCS | Facility: HOSPITAL | Age: 68
End: 2018-05-04
Payer: COMMERCIAL

## 2018-05-04 ENCOUNTER — OUTPATIENT (OUTPATIENT)
Dept: OUTPATIENT SERVICES | Facility: HOSPITAL | Age: 68
LOS: 1 days | End: 2018-05-04

## 2018-05-04 DIAGNOSIS — Z95.2 PRESENCE OF PROSTHETIC HEART VALVE: ICD-10-CM

## 2018-05-04 PROCEDURE — 93306 TTE W/DOPPLER COMPLETE: CPT | Mod: 26

## 2018-07-24 NOTE — DISCHARGE NOTE ADULT - MEDICATION SUMMARY - MEDICATIONS TO CHANGE
negative I will SWITCH the dose or number of times a day I take the medications listed below when I get home from the hospital:  None

## 2019-05-09 ENCOUNTER — OUTPATIENT (OUTPATIENT)
Dept: OUTPATIENT SERVICES | Facility: HOSPITAL | Age: 69
LOS: 1 days | End: 2019-05-09

## 2019-05-09 ENCOUNTER — APPOINTMENT (OUTPATIENT)
Dept: CV DIAGNOSITCS | Facility: HOSPITAL | Age: 69
End: 2019-05-09
Payer: COMMERCIAL

## 2019-05-09 DIAGNOSIS — Z95.2 PRESENCE OF PROSTHETIC HEART VALVE: ICD-10-CM

## 2019-05-09 DIAGNOSIS — I48.91 UNSPECIFIED ATRIAL FIBRILLATION: ICD-10-CM

## 2019-05-09 PROCEDURE — 93306 TTE W/DOPPLER COMPLETE: CPT | Mod: 26

## 2020-06-08 ENCOUNTER — OUTPATIENT (OUTPATIENT)
Dept: OUTPATIENT SERVICES | Facility: HOSPITAL | Age: 70
LOS: 1 days | End: 2020-06-08
Payer: COMMERCIAL

## 2020-06-08 ENCOUNTER — APPOINTMENT (OUTPATIENT)
Dept: CV DIAGNOSITCS | Facility: HOSPITAL | Age: 70
End: 2020-06-08

## 2020-06-08 DIAGNOSIS — R07.89 OTHER CHEST PAIN: ICD-10-CM

## 2020-06-08 PROCEDURE — 93227 XTRNL ECG REC<48 HR R&I: CPT

## 2020-06-08 PROCEDURE — 93306 TTE W/DOPPLER COMPLETE: CPT | Mod: 26

## 2021-06-04 ENCOUNTER — OUTPATIENT (OUTPATIENT)
Dept: OUTPATIENT SERVICES | Facility: HOSPITAL | Age: 71
LOS: 1 days | End: 2021-06-04

## 2021-06-04 ENCOUNTER — APPOINTMENT (OUTPATIENT)
Dept: CV DIAGNOSITCS | Facility: HOSPITAL | Age: 71
End: 2021-06-04
Payer: COMMERCIAL

## 2021-06-04 DIAGNOSIS — R07.9 CHEST PAIN, UNSPECIFIED: ICD-10-CM

## 2021-06-04 PROCEDURE — 93306 TTE W/DOPPLER COMPLETE: CPT | Mod: 26

## 2022-03-02 NOTE — PATIENT PROFILE ADULT. - AS SC BRADEN MOBILITY
Pt has tried to leave the ED x 3, twice in a gown and the third time naked.  Pt redirectable.  ERP bedside at this time.   (3) slightly limited

## 2022-07-11 ENCOUNTER — APPOINTMENT (OUTPATIENT)
Dept: CV DIAGNOSITCS | Facility: HOSPITAL | Age: 72
End: 2022-07-11

## 2022-07-11 ENCOUNTER — OUTPATIENT (OUTPATIENT)
Dept: OUTPATIENT SERVICES | Facility: HOSPITAL | Age: 72
LOS: 1 days | End: 2022-07-11

## 2022-07-11 DIAGNOSIS — R07.9 CHEST PAIN, UNSPECIFIED: ICD-10-CM

## 2022-07-11 PROCEDURE — 93306 TTE W/DOPPLER COMPLETE: CPT | Mod: 26

## 2023-01-08 NOTE — ED PROVIDER NOTE - MUSCULOSKELETAL, MLM
abnormal/expand...
Spine appears normal, range of motion is not limited, no muscle or joint tenderness

## 2023-03-10 NOTE — PATIENT PROFILE ADULT. - AS SC BRADEN FRICTION
- pt reports currently in remission but recently found a new lump and has not had it worked up yet as an outpt  - continue home dose letrozole  - supportive care   (3) no apparent problem

## 2023-08-08 ENCOUNTER — APPOINTMENT (OUTPATIENT)
Dept: CV DIAGNOSITCS | Facility: HOSPITAL | Age: 73
End: 2023-08-08

## 2023-08-29 ENCOUNTER — APPOINTMENT (OUTPATIENT)
Dept: CV DIAGNOSITCS | Facility: HOSPITAL | Age: 73
End: 2023-08-29
Payer: COMMERCIAL

## 2023-08-29 ENCOUNTER — OUTPATIENT (OUTPATIENT)
Dept: OUTPATIENT SERVICES | Facility: HOSPITAL | Age: 73
LOS: 1 days | End: 2023-08-29

## 2023-08-29 DIAGNOSIS — I25.10 ATHEROSCLEROTIC HEART DISEASE OF NATIVE CORONARY ARTERY WITHOUT ANGINA PECTORIS: ICD-10-CM

## 2023-08-29 DIAGNOSIS — I11.9 HYPERTENSIVE HEART DISEASE WITHOUT HEART FAILURE: ICD-10-CM

## 2023-08-29 DIAGNOSIS — Z95.4 PRESENCE OF OTHER HEART-VALVE REPLACEMENT: ICD-10-CM

## 2023-08-29 DIAGNOSIS — I48.20 CHRONIC ATRIAL FIBRILLATION, UNSPECIFIED: ICD-10-CM

## 2023-08-29 DIAGNOSIS — I34.0 NONRHEUMATIC MITRAL (VALVE) INSUFFICIENCY: ICD-10-CM

## 2023-08-29 DIAGNOSIS — I35.1 NONRHEUMATIC AORTIC (VALVE) INSUFFICIENCY: ICD-10-CM

## 2023-08-29 DIAGNOSIS — I50.32 CHRONIC DIASTOLIC (CONGESTIVE) HEART FAILURE: ICD-10-CM

## 2023-08-29 PROCEDURE — 93306 TTE W/DOPPLER COMPLETE: CPT | Mod: 26

## 2024-07-31 NOTE — PATIENT PROFILE ADULT. - FALLEN IN THE PAST
[Dear  ___] : Dear  [unfilled], [Courtesy Letter:] : I had the pleasure of seeing your patient, [unfilled], in my office today. [Please see my note below.] : Please see my note below. [Consult Closing:] : Thank you very much for allowing me to participate in the care of this patient.  If you have any questions, please do not hesitate to contact me. [Sincerely,] : Sincerely, [FreeTextEntry3] : Salas Funes MD [facility] no